# Patient Record
Sex: FEMALE | Race: WHITE | NOT HISPANIC OR LATINO | URBAN - METROPOLITAN AREA
[De-identification: names, ages, dates, MRNs, and addresses within clinical notes are randomized per-mention and may not be internally consistent; named-entity substitution may affect disease eponyms.]

---

## 2022-03-17 ENCOUNTER — NEW PATIENT (OUTPATIENT)
Dept: URBAN - METROPOLITAN AREA CLINIC 14 | Facility: CLINIC | Age: 72
End: 2022-03-17

## 2022-03-17 DIAGNOSIS — H25.13: ICD-10-CM

## 2022-03-17 PROCEDURE — 99204 OFFICE O/P NEW MOD 45 MIN: CPT

## 2022-03-17 PROCEDURE — 92136 OPHTHALMIC BIOMETRY: CPT

## 2022-03-17 ASSESSMENT — KERATOMETRY
OS_AXISANGLE_DEGREES: 97
OD_AXISANGLE2_DEGREES: 10
OD_K1POWER_DIOPTERS: 41.00
OS_K1POWER_DIOPTERS: 41.00
OS_K2POWER_DIOPTERS: 41.75
OS_AXISANGLE2_DEGREES: 7
OD_K2POWER_DIOPTERS: 41.75
OD_AXISANGLE_DEGREES: 100

## 2022-03-17 ASSESSMENT — TONOMETRY
OD_IOP_MMHG: 15
OS_IOP_MMHG: 15

## 2022-03-17 ASSESSMENT — VISUAL ACUITY
OS_BCVA: 20/25
OD_BCVA: 20/25
OS_CC: 20/25
OD_CC: 20/30

## 2022-03-22 ENCOUNTER — ESTABLISHED PATIENT (OUTPATIENT)
Dept: URBAN - METROPOLITAN AREA CLINIC 9 | Facility: CLINIC | Age: 72
End: 2022-03-22

## 2022-03-22 DIAGNOSIS — H25.13: ICD-10-CM

## 2022-03-22 PROCEDURE — 99199ADVT ADVANCED VISION TESTING

## 2022-03-22 ASSESSMENT — KERATOMETRY
OS_K2POWER_DIOPTERS: 41.75
OD_AXISANGLE_DEGREES: 100
OD_K2POWER_DIOPTERS: 41.75
OS_AXISANGLE_DEGREES: 97
OD_K1POWER_DIOPTERS: 41.00
OS_AXISANGLE2_DEGREES: 7
OS_K1POWER_DIOPTERS: 41.00
OD_AXISANGLE2_DEGREES: 10

## 2022-03-22 NOTE — PATIENT DISCUSSION
Patient elects best quality of distance vision and will wear reading glasses for intermediate and near vision.

## 2022-03-30 ENCOUNTER — POST-OP (OUTPATIENT)
Dept: URBAN - METROPOLITAN AREA CLINIC 14 | Facility: CLINIC | Age: 72
End: 2022-03-30

## 2022-03-30 DIAGNOSIS — H25.11: ICD-10-CM

## 2022-03-30 DIAGNOSIS — Z96.1: ICD-10-CM

## 2022-03-30 PROCEDURE — 99024 POSTOP FOLLOW-UP VISIT: CPT

## 2022-03-30 PROCEDURE — 92136 OPHTHALMIC BIOMETRY: CPT

## 2022-03-30 ASSESSMENT — KERATOMETRY
OD_K1POWER_DIOPTERS: 41.00
OS_K2POWER_DIOPTERS: 41.75
OD_AXISANGLE2_DEGREES: 10
OD_AXISANGLE_DEGREES: 100
OS_AXISANGLE2_DEGREES: 7
OS_K1POWER_DIOPTERS: 41.00
OS_AXISANGLE_DEGREES: 97
OD_K2POWER_DIOPTERS: 41.75

## 2022-03-30 ASSESSMENT — VISUAL ACUITY
OS_SC: 20/30
OD_BCVA: 20/25

## 2022-03-30 ASSESSMENT — TONOMETRY
OD_IOP_MMHG: 13
OS_IOP_MMHG: 17

## 2022-04-07 ENCOUNTER — POST-OP (OUTPATIENT)
Dept: URBAN - METROPOLITAN AREA CLINIC 14 | Facility: CLINIC | Age: 72
End: 2022-04-07

## 2022-04-07 DIAGNOSIS — Z96.1: ICD-10-CM

## 2022-04-07 PROCEDURE — 99024 POSTOP FOLLOW-UP VISIT: CPT

## 2022-04-07 ASSESSMENT — KERATOMETRY
OD_AXISANGLE_DEGREES: 100
OS_AXISANGLE2_DEGREES: 7
OD_K1POWER_DIOPTERS: 41.00
OD_AXISANGLE2_DEGREES: 10
OS_K2POWER_DIOPTERS: 41.75
OS_AXISANGLE_DEGREES: 97
OD_K2POWER_DIOPTERS: 41.75
OS_K1POWER_DIOPTERS: 41.00

## 2022-04-07 ASSESSMENT — VISUAL ACUITY
OS_SC: 20/30
OD_SC: 20/50

## 2022-04-07 ASSESSMENT — TONOMETRY
OS_IOP_MMHG: 13
OD_IOP_MMHG: 13

## 2022-05-05 ENCOUNTER — POST-OP (OUTPATIENT)
Dept: URBAN - METROPOLITAN AREA CLINIC 14 | Facility: CLINIC | Age: 72
End: 2022-05-05

## 2022-05-05 DIAGNOSIS — Z96.1: ICD-10-CM

## 2022-05-05 DIAGNOSIS — Z98.890: ICD-10-CM

## 2022-05-05 PROCEDURE — 99024 POSTOP FOLLOW-UP VISIT: CPT

## 2022-05-05 ASSESSMENT — VISUAL ACUITY
OD_SC: 20/20
OS_SC: 20/20
OS_SC: J16
OS_BCVA: 20/20
OD_BCVA: 20/20
OD_SC: J16

## 2022-05-05 ASSESSMENT — KERATOMETRY
OS_AXISANGLE_DEGREES: 100
OD_AXISANGLE_DEGREES: 100
OS_AXISANGLE2_DEGREES: 7
OD_K2POWER_DIOPTERS: 41.50
OD_AXISANGLE2_DEGREES: 10
OD_AXISANGLE_DEGREES: 0
OD_K2POWER_DIOPTERS: 41.75
OS_K2POWER_DIOPTERS: 41.75
OS_AXISANGLE_DEGREES: 97
OD_AXISANGLE2_DEGREES: 90
OD_K1POWER_DIOPTERS: 41.00
OS_K1POWER_DIOPTERS: 41.00
OD_K1POWER_DIOPTERS: 41.50
OS_AXISANGLE2_DEGREES: 10
OS_K2POWER_DIOPTERS: 41.50

## 2022-05-05 ASSESSMENT — TONOMETRY
OD_IOP_MMHG: 9
OS_IOP_MMHG: 11

## 2022-06-19 RX ORDER — ALPRAZOLAM 0.5 MG/1
TABLET ORAL
COMMUNITY
End: 2022-10-12

## 2022-06-19 RX ORDER — FLUCONAZOLE 150 MG/1
TABLET ORAL
COMMUNITY
Start: 2021-11-10 | End: 2022-10-12

## 2022-06-28 RX ORDER — LEVOTHYROXINE SODIUM 88 UG/1
TABLET ORAL
COMMUNITY
End: 2022-08-22

## 2022-06-28 RX ORDER — METFORMIN HYDROCHLORIDE 500 MG/1
TABLET, EXTENDED RELEASE ORAL
COMMUNITY

## 2022-07-27 ENCOUNTER — POST-OP (OUTPATIENT)
Dept: URBAN - METROPOLITAN AREA CLINIC 14 | Facility: CLINIC | Age: 72
End: 2022-07-27

## 2022-07-27 DIAGNOSIS — Z98.890: ICD-10-CM

## 2022-07-27 PROCEDURE — 99024 POSTOP FOLLOW-UP VISIT: CPT

## 2022-07-27 ASSESSMENT — VISUAL ACUITY
OS_SC: 20/20
OS_SC: J16
OD_SC: 20/20
OD_SC: J16

## 2022-07-27 NOTE — PATIENT DISCUSSION
Re-educated patient on the need for progressive SRx for computer work or activities that require the potential for multiple working distances. Verbal understanding given. SRx given, ok to fill as sunglasses or SRx.

## 2022-09-07 PROBLEM — K57.30 DIVERTICULOSIS OF LARGE INTESTINE WITHOUT HEMORRHAGE: Status: ACTIVE | Noted: 2022-09-07

## 2022-09-07 PROBLEM — N60.12 FIBROCYSTIC BREAST CHANGES OF BOTH BREASTS: Status: ACTIVE | Noted: 2022-09-07

## 2022-09-07 PROBLEM — N60.11 FIBROCYSTIC BREAST CHANGES OF BOTH BREASTS: Status: ACTIVE | Noted: 2022-09-07

## 2022-09-07 PROBLEM — L72.0 EPIDERMAL CYST: Status: ACTIVE | Noted: 2022-09-07

## 2022-09-07 PROBLEM — N60.19 FIBROCYSTIC BREAST CHANGES: Status: ACTIVE | Noted: 2022-09-07

## 2022-09-07 PROBLEM — E03.9 ACQUIRED HYPOTHYROIDISM: Status: ACTIVE | Noted: 2022-09-07

## 2022-09-07 PROBLEM — Z12.31 SCREENING MAMMOGRAM FOR BREAST CANCER: Status: ACTIVE | Noted: 2022-09-07

## 2022-09-07 PROBLEM — E11.65 HYPERGLYCEMIA DUE TO TYPE 2 DIABETES MELLITUS (HCC): Status: ACTIVE | Noted: 2022-09-07

## 2022-09-07 PROBLEM — E66.9 OBESITY (BMI 30.0-34.9): Status: ACTIVE | Noted: 2022-09-07

## 2022-09-07 PROBLEM — F32.9 REACTIVE DEPRESSION: Status: ACTIVE | Noted: 2022-09-07

## 2023-08-01 ENCOUNTER — ESTABLISHED PATIENT (OUTPATIENT)
Dept: URBAN - METROPOLITAN AREA CLINIC 14 | Facility: CLINIC | Age: 73
End: 2023-08-01

## 2023-08-01 DIAGNOSIS — E11.9: ICD-10-CM

## 2023-08-01 DIAGNOSIS — H16.223: ICD-10-CM

## 2023-08-01 PROCEDURE — 92015 DETERMINE REFRACTIVE STATE: CPT

## 2023-08-01 PROCEDURE — 92014 COMPRE OPH EXAM EST PT 1/>: CPT

## 2023-08-01 ASSESSMENT — VISUAL ACUITY
OU_CC: 20/20
OS_CC: 20/25-1
OD_CC: 20/20

## 2023-08-01 ASSESSMENT — TONOMETRY
OS_IOP_MMHG: 12
OD_IOP_MMHG: 12

## 2024-10-16 ENCOUNTER — COMPREHENSIVE EXAM (OUTPATIENT)
Dept: URBAN - METROPOLITAN AREA CLINIC 14 | Facility: CLINIC | Age: 74
End: 2024-10-16

## 2024-10-16 DIAGNOSIS — E11.9: ICD-10-CM

## 2024-10-16 DIAGNOSIS — H04.123: ICD-10-CM

## 2024-10-16 PROCEDURE — 99213 OFFICE O/P EST LOW 20 MIN: CPT
